# Patient Record
(demographics unavailable — no encounter records)

---

## 2018-05-02 NOTE — DIAGNOSTIC IMAGING REPORT
MRI OF THE  RIGHT  SHOULDER



CLINICAL HISTORY:  Right shoulder pain.



COMPARISON STUDY: No priors.



TECHNIQUE: MRI of the right shoulder was performed utilizing various T1 and T2

weighted sequences in the axial, sagittal, coronal planes. IV contrast was not

administered for this examination. Note that interpretation is suboptimal

without plain film correlate. The examination is modestly degraded by motion

artifact.



FINDINGS:



Rotator cuff: There is tendinopathy of the supraspinatous tendon. There is

high-grade partial-thickness tearing with a full-thickness tear seen

approximately 8 mm from the leading edge on coronal image #9 and sagittal image

#4. The tear measures at least 4 mm in AP diameter. There is no musculotendinous

retraction. There is tendinopathy with high-grade partial-thickness tearing of

the infraspinatus tendon. No full-thickness infraspinatus tear is seen. The

teres minor and subscapularis tendons are intact. There is trace subacromial and

subdeltoid bursal fluid. The acromioclavicular joint appears maintained noting

mild productive degenerative change.



Biceps tendon: The long head of the biceps tendon is normal in signal intensity

and located within the bicipital groove. The anchor is maintained.



Labrum: Grossly intact.



Shoulder joint: There is a small joint effusion. Fluid is seen tracking

inferiorly along the medial humeral shaft suggesting a HAGL injury. The

articular cartilage over the glenoid is well maintained. There is no MRI

evidence of fracture. Mild degenerative marrow edema is seen in the greater

tuberosity of the humeral head.



Musculature and soft tissues: There is mild edema within the bodies of

supraspinatus and infraspinatus. No muscular atrophy is seen.





IMPRESSION: 



1. There is tendinopathy with high-grade partial thickness tearing as well as a

full-thickness tear of the supraspinatus tendon as above. No musculotendinous

retraction is seen.



2. There is tendinopathy with high-grade partial-thickness tearing of

infraspinatus.



3. Mild muscular edema is seen in the bodies of supraspinatus and infraspinatus.



4. There is a small joint effusion. Findings suggest a HAGL lesion.







Electronically signed by:  Florentino Ng M.D.

5/2/2018 9:39 PM



Dictated Date/Time:  5/2/2018 9:33 PM

## 2018-05-14 NOTE — HISTORY AND PHYSICAL
History & Physical


Date


May 14, 2018.





Chief Complaint


Right Shoulder pain





History of Present Illness


The patient is a 48 year old female with complaints of Right shoulder pain. She 

sustained a fall on April 20. MRI was obtained and demonstrated a full 

thickness RCT. She has been doing physical therapy on her shoulder. Patient 

would like to proceed with arthroscopic rotator cuff repair. Patient denies 

fever, chills, sweats, chest pain, sob, chavez, wheezing, n/v/d/c, numbness, 

tingling, urinary problems. ROS positive for headache and joint pain.





Past Medical/Surgical History


PMHx: Hypothyroidism, migraines





PSHx: Cholecystectomy, EGD





Social History: Patient is a non smoker, rare alcohol use. Denies drug use.





Medications: Synthroid, Topamax, Lamictal, Cymbalta, Multivitamin





Allergies: Penicillins-hives, Codeine-throat and tongue swelling.





Additional History


Hepatic Disease:  No


Endocrine Disorder:  Yes (hypothyroid)


Kidney Disease:  No


Hypertension:  No


Heart Disease:  No


Bleeding Tendencies:  No


Infectious Diseases:  No


Other:


Had episode of hypotension with anesthesia last year after EGD





Physical Examination


Skin:  warm/dry, no rash


Eyes:  normal inspection, sclerae normal


ENT:  normal ENT inspection, pharynx normal


Head:  normocephalic, atraumatic


Neck:  supple, no adenopathy, trachea midline


Respiratory/Chest:  lungs clear, normal breath sounds


Cardiovascular:  regular rate, rhythm, no murmur


Extremities:  + pertinent finding (Decreased ROM in all directions. Abduction 

to 90 degrees actively, 160 passively, flexion to 120, ER 60. Strength 3/5 with 

supraspinatus. Tenderness lateral and anterior acromion over greater tuberosity)


Neurologic/Psych:  no motor/sensory deficits, alert, oriented x 3





Diagnosis


Right shoulder Rotator cuff tear





Plan of Treatment


 


We reviewed the results of the MRI.  It demonstrates a full-thickness rotator 

cuff tear.  We discussed various treatment measures and they wish to proceed 

with arthroscopic repair.  Risks, benefits and alternatives to surgery 

including but not limited to infection, DVT, pain, stiffness, need for revision 

surgery, failure to relieve all symptoms, re-tear, damage to blood vessels, 

damage to nerves, risks of anesthesia were discussed with the patient and they 

wish to proceed.  We will get this scheduled at their convenience. Surgery is 

scheduled for 5/18/18 for right shoulder arthroscopic rotator cuff repair and 

SAD.

## 2018-05-18 NOTE — ANESTHESIOLOGY PROGRESS NOTE
Anesthesia Post Op Note


Date & Time


May 18, 2018 at 12:10





Vital Signs


Pain Intensity:  0





Vital Signs Past 12 Hours








  Date Time  Temp Pulse Resp B/P (MAP) Pulse Ox O2 Delivery O2 Flow Rate FiO2


 


5/18/18 12:05 36.3 85 18 123/73 97 Nasal Cannula 2 


 


5/18/18 11:55  84 18 106/70 95 Nasal Cannula 2 


 


5/18/18 11:45  87 18 109/60 96 Oxymask 10 


 


5/18/18 11:35  88 18 120/74 96 Oxymask 10 


 


5/18/18 11:29 36.6 86 16 115/68 95 Oxymask 10 


 


5/18/18 07:19 36.4 99 20 152/82 (105) 96 Room Air  











Notes


Mental Status:  alert / awake / arousable, participated in evaluation


Pt Amnestic to Procedure:  Yes


Nausea / Vomiting:  adequately controlled


Pain:  adequately controlled


Airway Patency, RR, SpO2:  stable & adequate


BP & HR:  stable & adequate


Hydration State:  stable & adequate


Anesthetic Complications:  no major complications apparent

## 2018-05-18 NOTE — DISCHARGE INSTRUCTIONS
Discharge Instructions


Date of Service


May 18, 2018.





Visit


Reason for Visit:  Right Shoulder Rotator Cuff Tear, Impingement Synd





Discharge


Discharge Diagnosis / Problem:  Right shouler RTC tear, impingement syndrome





Discharge Goals


Goal(s):  Decrease discomfort, Improve function





Activity Recommendations


Activity Limitations:  per Instructions/Follow-up section





Anesthesia


.





Post Anesthesia Instructions:





If you have had General Anesthesia or IV Sedation:





*  Do not drive today.


*  Resume driving when surgeon permits.


*  Do not make important decisions or sign legal documents today.


*  Call surgeon for:





   1.  Temperature elevations greater than 101 degrees F.


   2.  Uncontrollable pain.


   3.  Excessive bleeding.


   4.  Persistent nausea and vomiting.


   5.  Medication intolerance (nausea, vomiting or rash).





*  For nausea and vomiting use only clear liquids such as: tea, soda, bouillon 

until nausea subsides, then gradually increase diet as tolerated.





*  If you have any concerns or questions, call your surgeon's office.  If 

physician is unavailable and it is an emergency, call 911 or go to the nearest 

emergency room.





.





Instructions / Follow-Up


Instructions / Follow-Up








                                                                               

  U DISCHARGE INSTRUCTIONS:  ROTATOR CUFF REPAIR





SELF CARE INSTRUCTIONS 





A. You are permitted to loosen your sling/immobilizer to move your elbow, wrist

, and hand to prevent stiffness. 


     You should use your well arm (good arm) to assist the operated extremity 

when trying to raise the arm away from the body, hygiene purposes.


     Do NOT actively try to use/engage your shoulder muscles in operative arm 

at this time. You should NOT do overhead activity, lifting, or attempt to reach 

behind 


     your back.





B. You may/may not be instructed to start Physical Therapy upon discharge 

depending upon the size and difficulty of the repair. 


    You will be provided a prescription for therapy with specific restrictions, 

if needed, at time of discharge.





C. At 48 hours post-operatively, you may change your dressing. (Leave white 

steri-strips intact if present). Use band-aids and change daily. 


    You are allowed to shower at this time and get the incision area wet, but 

DO NOT soak or submerge incision area in water. (No baths, swimming pools, hot 

tubs)





D. Do NOT apply soap or any ointment/lotions directly over incision.





E. You may use ice as needed to operative shoulder








SPECIAL CARE INSTRUCTIONS:


   


**VERY IMPORTANT TO READ AND REVIEW**





A. There are a few signs you need to watch for after you are home. Call 

[facility] at [phone] if you experience any of the 

following:





                  a. Increased severe shoulder pain. Some pain is expected 

especially when you exercise





                  b. Increased swelling in your shoulder or arm; pain or 

swelling in either upper extremity. 


                    (Note: swelling and stiffness is normal and expected for 

several weeks post op, depending on type of shoulder surgery you had).





                 c. Any fluid or drainage from the incision; redness of the 

incision.





                 d. Shortness of breath or chest pain.


 


B. Please call [facility] at [phone] if you have any 

questions or concerns about your operation or recovery.





C. Call your physician if:





              a. Temperature is greater than 101 degrees (F).





              b. Pain is not relieved by prescribed pain medications.





              c. Increase drainage or redness from incision.





              d. Unanswered questions or concerns.





D. Pain Medication:





               a. You will be prescribed pain medication upon discharge that 

should last till your first post-operative appointment.





               b. If you experience nausea and/or skin rash, discontinue this 

medication and contact our office for an alternative medication.





               c. Caution- narcotic pain medication can cause constipation. 








FOLLOW UP VISIT:





Please call [facility] at [phone] to schedule a follow 

up appointment with Dr. Zhu 10-14 days from your surgery date.





Diet Recommendations


Recommended Home Diet:  resume previous diet





Procedures


Procedures Performed:  


Right Shoulder Arthroscopy with Rotator Cuff Repair, Subacromial Decompression, 


Extensive Debridement





Pending Studies


Studies pending at discharge:  no





Medical Emergencies








.


Who to Call and When:





Medical Emergencies:  If at any time you feel your situation is an emergency, 

please call 911 immediately.





.





Non-Emergent Contact


Non-Emergency issues call your:  Surgeon


Call Non-Emergent contact if:  temperature is above 101, your pain is not 

controlled, your pain is concerning you, wound has increased drainage, wound 

has increased redness





.


.








"Provider Documentation" section prepared by Hiram Flower.








.





[place] Drug Monitoring Program


Search Results:  patient reviewed within database, no issues identified